# Patient Record
Sex: FEMALE | Race: BLACK OR AFRICAN AMERICAN | NOT HISPANIC OR LATINO | URBAN - METROPOLITAN AREA
[De-identification: names, ages, dates, MRNs, and addresses within clinical notes are randomized per-mention and may not be internally consistent; named-entity substitution may affect disease eponyms.]

---

## 2018-11-13 ENCOUNTER — TELEPHONE (OUTPATIENT)
Dept: OBSTETRICS AND GYNECOLOGY | Age: 28
End: 2018-11-13

## 2018-11-13 NOTE — TELEPHONE ENCOUNTER
New Patient  Insurance- [facility]  Requested Dr. Bailey    Patient is wanting to come in for pre conception appointment. Just got  and wants to get established at a OBGYN before pregnancy.

## 2018-11-15 ENCOUNTER — TELEPHONE (OUTPATIENT)
Dept: OBSTETRICS AND GYNECOLOGY | Age: 28
End: 2018-11-15

## 2019-02-27 ENCOUNTER — OFFICE VISIT (OUTPATIENT)
Dept: OBSTETRICS AND GYNECOLOGY | Age: 29
End: 2019-02-27

## 2019-02-27 VITALS
DIASTOLIC BLOOD PRESSURE: 74 MMHG | SYSTOLIC BLOOD PRESSURE: 110 MMHG | HEIGHT: 68 IN | WEIGHT: 268 LBS | BODY MASS INDEX: 40.62 KG/M2

## 2019-02-27 DIAGNOSIS — Z11.3 SCREEN FOR STD (SEXUALLY TRANSMITTED DISEASE): ICD-10-CM

## 2019-02-27 DIAGNOSIS — Z31.69 PRE-CONCEPTION COUNSELING: ICD-10-CM

## 2019-02-27 DIAGNOSIS — Z01.419 WELL WOMAN EXAM WITH ROUTINE GYNECOLOGICAL EXAM: Primary | ICD-10-CM

## 2019-02-27 PROBLEM — Z82.49 FAMILY HISTORY OF DVT: Status: ACTIVE | Noted: 2019-02-27

## 2019-02-27 PROCEDURE — 99385 PREV VISIT NEW AGE 18-39: CPT | Performed by: OBSTETRICS & GYNECOLOGY

## 2019-02-27 RX ORDER — MULTIPLE VITAMINS W/ MINERALS TAB 9MG-400MCG
1 TAB ORAL DAILY
COMMUNITY

## 2019-02-27 NOTE — PROGRESS NOTES
Chief complaint: annual exam/ New gyn    Subjective   History of Present Illness    Chandni Rucker is a 29 y.o.  who presents for annual exam. Recently  and desires pregnancy soon. Has been trying diet and exercise for weight loss for pre-conception. Has lost 20 lbs with diet and exercise. Takes a multivitamin.  Her menses are regular every 28-30 days, lasting 5 days. No significant menorrhagia or dysmenorrhea. S/p gardasil x3.   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 She and  also [place] [employer] their [employer]    Obstetric History:  OB History      Para Term  AB Living    0 0 0 0 0 0    SAB TAB Ectopic Molar Multiple Live Births    0 0 0 0 0 0         Menstrual History:     Patient's last menstrual period was 2019.         Current contraception: none  History of abnormal Pap smear: no  Received Gardasil immunization: yes - x3  Perform regular self breast exam: yes -    Family history of uterine or ovarian cancer: no  Family History of colon cancer: no  Family history of breast cancer: no    Mammogram: not indicated.  Colonoscopy: not indicated.  DEXA: not indicated.    Exercise: moderately active  Calcium/Vitamin D: adequate intake    The following portions of the patient's history were reviewed and updated as appropriate: allergies, current medications, past family history, past medical history, past social history, past surgical history and problem list.    Review of Systems   Constitutional: Negative for activity change, fatigue, fever and unexpected weight change.   Respiratory: Negative for chest tightness and shortness of breath.    Cardiovascular: Negative for chest pain, palpitations and leg swelling.   Gastrointestinal: Negative for abdominal distention, abdominal pain, blood in stool, constipation, diarrhea, nausea and vomiting.   Endocrine: Negative for cold intolerance, heat intolerance,  "polydipsia, polyphagia and polyuria.   Genitourinary: Negative.    Musculoskeletal: Negative for arthralgias and back pain.   Skin: Negative for color change.   Neurological: Negative for weakness and headaches.   Hematological: Does not bruise/bleed easily.   Psychiatric/Behavioral: Negative for confusion.       Pertinent items are noted in HPI.     Objective   Physical Exam    /74   Ht 171.5 cm (67.5\")   Wt 122 kg (268 lb)   LMP 02/16/2019   Breastfeeding? No   BMI 41.36 kg/m²     General:   alert, appears stated age and cooperative, obese   Neck: no asymmetry, masses, or scars   Heart: regular rate and rhythm, S1, S2 normal, no murmur, click, rub or gallop   Lungs: clear to auscultation bilaterally   Abdomen: soft, non-tender, without masses or organomegaly   Breast: inspection negative, no nipple discharge or bleeding, no masses or nodularity palpable   Vulva: normal, Bartholin's, Urethra, Gateway's normal   Vagina: normal mucosa   Cervix: no bleeding following Pap, no cervical motion tenderness, no lesions and nulliparous appearance   Uterus: normal size, anteverted, mobile, non-tender, normal shape and consistency   Adnexa: normal adnexa and no mass, fullness, tenderness   Rectal: not indicated     Assessment/Plan   Chandni was seen today for gynecologic exam.    Diagnoses and all orders for this visit:    Well woman exam with routine gynecological exam  -     Pap Lb, Ct-Ng, rfx HPV ASCU    Pre-conception counseling  -     Rubella Antibody, IgG    Screen for STD (sexually transmitted disease)  -     Pap Lb, Ct-Ng, rfx HPV ASCU    discussed pre-conception guidelines. Recommend prenatal vitamin for folic acid to prevent NTD. Discussed fertile days of cycle. Recommend weight loss before pregnancy. Reviewed risks of obesity and pregnancy (increased risk of HTN, pre-eclampsia, GDM and DVT). She will continue to eat better and exercise. Given name for weight loss . Discussed toxo, listeria and mercury " warnings.    Breast self exam technique reviewed and patient encouraged to perform self-exam monthly.  Discussed healthy lifestyle modifications.  Pap smear sent

## 2019-02-28 LAB — RUBV IGG SERPL IA-ACNC: 3.54 INDEX

## 2019-03-01 ENCOUNTER — TELEPHONE (OUTPATIENT)
Dept: OBSTETRICS AND GYNECOLOGY | Age: 29
End: 2019-03-01

## 2019-03-01 NOTE — TELEPHONE ENCOUNTER
----- Message from Cindy Bailey MD sent at 2/28/2019  9:55 AM EST -----  Rubella immune, no vaccine needed

## 2019-03-05 LAB
C TRACH RRNA CVX QL NAA+PROBE: NEGATIVE
CONV .: ABNORMAL
CYTOLOGIST CVX/VAG CYTO: ABNORMAL
DX ICD CODE: ABNORMAL
DX ICD CODE: ABNORMAL
HIV 1 & 2 AB SER-IMP: ABNORMAL
HPV I/H RISK 1 DNA CVX QL PROBE+SIG AMP: POSITIVE
N GONORRHOEA RRNA CVX QL NAA+PROBE: NEGATIVE
OTHER STN SPEC: ABNORMAL
PATH REPORT.FINAL DX SPEC: ABNORMAL
PATHOLOGIST CVX/VAG CYTO: ABNORMAL
STAT OF ADQ CVX/VAG CYTO-IMP: ABNORMAL

## 2019-03-07 PROBLEM — R87.610 ASCUS WITH POSITIVE HIGH RISK HPV CERVICAL: Status: ACTIVE | Noted: 2019-03-07

## 2019-03-07 PROBLEM — R87.810 ASCUS WITH POSITIVE HIGH RISK HPV CERVICAL: Status: ACTIVE | Noted: 2019-03-07

## 2019-04-17 ENCOUNTER — TELEPHONE (OUTPATIENT)
Dept: OBSTETRICS AND GYNECOLOGY | Age: 29
End: 2019-04-17

## 2019-04-17 NOTE — TELEPHONE ENCOUNTER
LMTC to correct & r/s w Dr Bailey same day instead of w PA, error made due to scheduling computers issues this week.

## 2019-04-17 NOTE — TELEPHONE ENCOUNTER
Dr Bailey pt called to cancel her scheduled colpo for 5/9/19 because she received two positive pregnancy tests this morning. Yet both positive lines were faint. Pt scheduled her pregnancy confirmation & u/s 5/15/19 w Hortencia. Does pt need to have lab orders placed for a blood pregnancy test? Reports this is her first pregnancy & is slightly concerned. Also pt wanted to be sure she needed to cancel her colposcopy? Please advise.

## 2019-04-17 NOTE — TELEPHONE ENCOUNTER
Please reschedule pregnancy confirmation with me. I prefer to see my own patients for the new ob appt. Ok to cancel colpo.

## 2021-04-16 ENCOUNTER — BULK ORDERING (OUTPATIENT)
Dept: CASE MANAGEMENT | Facility: OTHER | Age: 31
End: 2021-04-16

## 2021-04-16 DIAGNOSIS — Z23 IMMUNIZATION DUE: ICD-10-CM
